# Patient Record
Sex: MALE | Race: WHITE
[De-identification: names, ages, dates, MRNs, and addresses within clinical notes are randomized per-mention and may not be internally consistent; named-entity substitution may affect disease eponyms.]

---

## 2019-02-14 ENCOUNTER — HOSPITAL ENCOUNTER (EMERGENCY)
Dept: HOSPITAL 80 - FED | Age: 19
Discharge: HOME | End: 2019-02-14
Payer: COMMERCIAL

## 2019-02-14 VITALS — DIASTOLIC BLOOD PRESSURE: 50 MMHG | SYSTOLIC BLOOD PRESSURE: 121 MMHG

## 2019-02-14 DIAGNOSIS — S01.01XA: Primary | ICD-10-CM
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
## 2019-02-14 PROCEDURE — 0HQ0XZZ REPAIR SCALP SKIN, EXTERNAL APPROACH: ICD-10-PCS

## 2023-04-28 NOTE — EDPHY
H & P


Time Seen by Provider: 02/14/19 13:08


HPI/ROS: 





Chief complaint.  Scalp laceration





HPI.  18-year-old male here with laceration to the back of his head.  He was at 

the [facility] lifting weights.  He was bending over to 

replace a larger weight at the bottom and a 10 lb weight was on the into the 

rack above fell off struck him in the back of the head.  He did not lose 

consciousness.  No headache, no visual symptoms.  No neck pain.  Denies any 

other injuries.  Injury occurred just prior to arrival.





ROS


10 systems were reviewed and negative with the exception of the elements 

mentioned in the history of present illness





Past Medical/Surgical History: 





Healthy


Social History: 





Single, nonsmoker, no alcohol


Smoking Status: Never smoked


Physical Exam: 





General Appearance:  Alert well-developed male mild distress vital signs are 

stable


Eyes: Pupils equal and round no pallor or injection.


ENT, Mouth:  Mucous membranes are moist.


Respiratory:  There are no retractions, lungs are clear to auscultation.


Cardiovascular: Regular rate and rhythm.


Gastrointestinal:   Abdomen is soft and nontender, no masses, bowel sounds 

normal.


Neurological:  Awake and alert, sensory and motor exams grossly normal.


Skin:  2 cm superficial laceration to the occipital scalp


Musculoskeletal:  Neck is supple nontender.


Extremities  symmetrical, full range of motion.


Psychiatric: Patient is oriented X 3, there is no agitation.


Constitutional: 


 Initial Vital Signs











Temperature (C)  36.8 C   02/14/19 12:55


 


Heart Rate  66   02/14/19 12:55


 


Respiratory Rate  16   02/14/19 12:55


 


Blood Pressure  121/50 H  02/14/19 12:55


 


O2 Sat (%)  96   02/14/19 12:55








 











O2 Delivery Mode               Room Air














Allergies/Adverse Reactions: 


 





No Known Allergies Allergy (Unverified 02/14/19 12:55)


 








Home Medications: 














 Medication  Instructions  Recorded


 


NK [No Known Home Meds]  02/14/19














Medical Decision Making


Procedures: 





Wound is cleaned.  Inspection shows no evidence for foreign body.  It is closed 

with Dermabond by me


ED Course/Re-evaluation: 





Patient remained stable.  He and I discussed treatment plan including criteria 

for return importance of follow-up further evaluation.  He expresses 

understanding and agreement


Differential Diagnosis: 





Scalp laceration without evidence of concussion or loss of consciousness.  No 

evidence for skull fracture





Departure





- Departure


Disposition: Home, Routine, Self-Care


Clinical Impression: 


Scalp laceration


Qualifiers:


 Encounter type: initial encounter Qualified Code(s): S01.01XA - Laceration 

without foreign body of scalp, initial encounter





Condition: Good


Instructions:  Skin Adhesive Care (ED)


Additional Instructions: 


Ice to sore area of your head off and on today.





You may shower with the skin glue on





Tylenol 1000 mg every 6 hr, ibuprofen 600 mg every 6 hr as needed for discomfort





Return for worsening headache, confusion, vomiting





Skin glue will come off in about 5 days


Referrals: 


NONE *PRIMARY CARE P,. [Primary Care Provider] - As per Instructions


[facility] [Outside] - 2-3 days, if not improved
no